# Patient Record
Sex: MALE | Race: WHITE | NOT HISPANIC OR LATINO | URBAN - METROPOLITAN AREA
[De-identification: names, ages, dates, MRNs, and addresses within clinical notes are randomized per-mention and may not be internally consistent; named-entity substitution may affect disease eponyms.]

---

## 2024-06-18 ENCOUNTER — APPOINTMENT (OUTPATIENT)
Dept: URBAN - METROPOLITAN AREA CLINIC 256 | Age: 24
Setting detail: DERMATOLOGY
End: 2024-06-18

## 2024-06-18 ENCOUNTER — TELEPHONE (OUTPATIENT)
Dept: SURGERY | Facility: CLINIC | Age: 24
End: 2024-06-18

## 2024-06-18 VITALS — WEIGHT: 200 LBS | HEIGHT: 71 IN

## 2024-06-18 DIAGNOSIS — L05.91 PILONIDAL CYST WITHOUT ABSCESS: ICD-10-CM

## 2024-06-18 PROCEDURE — OTHER PATIENT SPECIFIC COUNSELING: OTHER

## 2024-06-18 PROCEDURE — OTHER MIPS QUALITY: OTHER

## 2024-06-18 PROCEDURE — OTHER COUNSELING: OTHER

## 2024-06-18 PROCEDURE — 99202 OFFICE O/P NEW SF 15 MIN: CPT

## 2024-06-18 ASSESSMENT — LOCATION SIMPLE DESCRIPTION DERM: LOCATION SIMPLE: LOWER BACK

## 2024-06-18 ASSESSMENT — LOCATION DETAILED DESCRIPTION DERM: LOCATION DETAILED: INFERIOR LUMBAR SPINE

## 2024-06-18 ASSESSMENT — LOCATION ZONE DERM: LOCATION ZONE: TRUNK

## 2024-06-18 NOTE — TELEPHONE ENCOUNTER
[facility] Call Center    Phone Message    May a detailed message be left on voicemail: yes     Reason for Call: Other: Hussein is calling in looking to speak with someone about being scheduled to be seen for a Pilonidal Cyst. HP TE being sent per guidelines as Pt does not have a referral.     Action Taken: Message routed to:  [facility] ([facility]    Travel Screening: Not Applicable     Date of Service:

## 2024-06-18 NOTE — PROCEDURE: PATIENT SPECIFIC COUNSELING
Detail Level: Detailed
- Informed patient symptoms appear consistent with previous diagnosis of pilonidal cyst\\n- Recommend patient refer to colorectal/GI surgeon to get completely resolved\\n- Informed patient it can be a chronic condition\\n- Advised patient to find surgeon through insurance network\\n- Discussed possible treatment option of oral antibiotic until patient can get with surgeon\\n- Advised patient to call back if he experiences pain or would like to start antibiotics, but he defers at this time.\\n- Patient was agreeable

## 2024-06-18 NOTE — HPI: SKIN LESION
What Type Of Note Output Would You Prefer (Optional)?: Standard Output
treated_been_treated
Is This A New Presentation, Or A Follow-Up?: Skin Lesion
Additional History: -Went to urgent care 1 month ago, got drained\\n-gone for 2-3 weeks\\n-prescribed doxycycline 100mg BID for 10 days\\n- told it might come back
When Was It Treated?: 05/2024

## 2024-06-19 NOTE — TELEPHONE ENCOUNTER
Pilonidal Cyst Triage note:     Pt states he already has an appt at another clinic and does not wish to schedule with us